# Patient Record
Sex: MALE | Race: WHITE
[De-identification: names, ages, dates, MRNs, and addresses within clinical notes are randomized per-mention and may not be internally consistent; named-entity substitution may affect disease eponyms.]

---

## 2018-12-12 ENCOUNTER — HOSPITAL ENCOUNTER (OUTPATIENT)
Dept: HOSPITAL 76 - DI | Age: 63
Discharge: HOME | End: 2018-12-12
Attending: NURSE PRACTITIONER
Payer: COMMERCIAL

## 2018-12-12 ENCOUNTER — HOSPITAL ENCOUNTER (OUTPATIENT)
Dept: HOSPITAL 76 - LAB.F | Age: 63
Discharge: HOME | End: 2018-12-12
Attending: NURSE PRACTITIONER
Payer: COMMERCIAL

## 2018-12-12 DIAGNOSIS — Z12.5: ICD-10-CM

## 2018-12-12 DIAGNOSIS — M17.0: Primary | ICD-10-CM

## 2018-12-12 DIAGNOSIS — Z13.220: Primary | ICD-10-CM

## 2018-12-12 LAB
CHOLEST SERPL-MCNC: 153 MG/DL
HDLC SERPL-MCNC: 41 MG/DL
HDLC SERPL: 3.7 {RATIO} (ref ?–5)
LDLC SERPL CALC-MCNC: 88 MG/DL
LDLC/HDLC SERPL: 2.1 {RATIO} (ref ?–3.6)
VLDLC SERPL-SCNC: 24 MG/DL

## 2018-12-12 PROCEDURE — 80061 LIPID PANEL: CPT

## 2018-12-12 PROCEDURE — 84153 ASSAY OF PSA TOTAL: CPT

## 2018-12-12 PROCEDURE — 36415 COLL VENOUS BLD VENIPUNCTURE: CPT

## 2018-12-12 PROCEDURE — 83721 ASSAY OF BLOOD LIPOPROTEIN: CPT

## 2018-12-12 NOTE — XRAY REPORT
Reason:  KNEE JOINT PAIN

Procedure Date:  12/12/2018   

Accession Number:  [id number] / [id number]                    

Procedure:  XR  - Knee 4 View BILAT CPT Code:  

 

FULL RESULT:

 

 

EXAMS:

1. Right Knee Radiography

2. Left Knee Radiography

 

EXAM DATE:12/12/2018 10:18 AM.

 

CLINICAL HISTORY:Knee joint pain.

 

COMPARISON: None.

 

TECHNIQUE: 4 views each.

 

FINDINGS:

Right Knee:

Bones: Normal. No fractures or bone lesions.

 

Joints: Mild loss of joint space height predominantly in the medial 

femorotibial compartment. No effusion. No subluxations.

 

Soft Tissues: Normal. No soft tissue swelling.

 

Left Knee:

Bones: Normal. No fractures or bone lesions.

 

Joints: Mild loss of joint space height predominantly in the medial 

femorotibial compartment. No effusion. No subluxations.

 

Soft Tissues: Normal. No soft tissue swelling.

IMPRESSION:

Bilateral mild degenerative changes.

 

RADIA

## 2023-10-29 ENCOUNTER — HOSPITAL ENCOUNTER (EMERGENCY)
Dept: HOSPITAL 76 - ED | Age: 68
Discharge: HOME | End: 2023-10-29
Payer: MEDICARE

## 2023-10-29 VITALS — SYSTOLIC BLOOD PRESSURE: 133 MMHG | DIASTOLIC BLOOD PRESSURE: 73 MMHG | OXYGEN SATURATION: 95 %

## 2023-10-29 DIAGNOSIS — K56.41: Primary | ICD-10-CM

## 2023-10-29 LAB
ALBUMIN DIAFP-MCNC: 4.8 G/DL (ref 3.2–5.5)
ALBUMIN/GLOB SERPL: 1.7 {RATIO} (ref 1–2.2)
ALP SERPL-CCNC: 69 IU/L (ref 42–121)
ALT SERPL W P-5'-P-CCNC: 28 IU/L (ref 10–60)
ANION GAP SERPL CALCULATED.4IONS-SCNC: 6 MMOL/L (ref 6–13)
AST SERPL W P-5'-P-CCNC: 21 IU/L (ref 10–42)
BASOPHILS NFR BLD AUTO: 0.1 10^3/UL (ref 0–0.1)
BASOPHILS NFR BLD AUTO: 0.6 %
BILIRUB BLD-MCNC: 0.8 MG/DL (ref 0.2–1)
BUN SERPL-MCNC: 12 MG/DL (ref 6–20)
CALCIUM UR-MCNC: 9.7 MG/DL (ref 8.5–10.3)
CHLORIDE SERPL-SCNC: 102 MMOL/L (ref 101–111)
CO2 SERPL-SCNC: 31 MMOL/L (ref 21–32)
CREAT SERPLBLD-SCNC: 0.8 MG/DL (ref 0.6–1.3)
EOSINOPHIL # BLD AUTO: 0.1 10^3/UL (ref 0–0.7)
EOSINOPHIL NFR BLD AUTO: 1.7 %
ERYTHROCYTE [DISTWIDTH] IN BLOOD BY AUTOMATED COUNT: 12.8 % (ref 12–15)
GFRSERPLBLD MDRD-ARVRAT: 97 ML/MIN/{1.73_M2} (ref 89–?)
GLOBULIN SER-MCNC: 2.9 G/DL (ref 2.1–4.2)
GLUCOSE SERPL-MCNC: 140 MG/DL (ref 74–104)
HCT VFR BLD AUTO: 46.7 % (ref 42–52)
HGB UR QL STRIP: 15.6 G/DL (ref 14–18)
LIPASE SERPL-CCNC: 12 U/L (ref 11–82)
LYMPHOCYTES # SPEC AUTO: 0.8 10^3/UL (ref 1.5–3.5)
LYMPHOCYTES NFR BLD AUTO: 10.3 %
MCH RBC QN AUTO: 31.8 PG (ref 27–31)
MCHC RBC AUTO-ENTMCNC: 33.4 G/DL (ref 32–36)
MCV RBC AUTO: 95.3 FL (ref 80–94)
MONOCYTES # BLD AUTO: 0.6 10^3/UL (ref 0–1)
MONOCYTES NFR BLD AUTO: 7.7 %
NEUTROPHILS # BLD AUTO: 6.5 10^3/UL (ref 1.5–6.6)
NEUTROPHILS # SNV AUTO: 8.1 X10^3/UL (ref 4.8–10.8)
NEUTROPHILS NFR BLD AUTO: 79.5 %
NRBC # BLD AUTO: 0 /100WBC
NRBC # BLD AUTO: 0 X10^3/UL
PDW BLD AUTO: 9.8 FL (ref 7.4–11.4)
PLATELET # BLD: 212 10^3/UL (ref 130–450)
POTASSIUM SERPL-SCNC: 4 MMOL/L (ref 3.5–4.5)
PROT SPEC-MCNC: 7.7 G/DL (ref 6.4–8.9)
RBC MAR: 4.9 10^6/UL (ref 4.7–6.1)
SODIUM SERPLBLD-SCNC: 139 MMOL/L (ref 135–145)

## 2023-10-29 PROCEDURE — 83690 ASSAY OF LIPASE: CPT

## 2023-10-29 PROCEDURE — 99283 EMERGENCY DEPT VISIT LOW MDM: CPT

## 2023-10-29 PROCEDURE — 85025 COMPLETE CBC W/AUTO DIFF WBC: CPT

## 2023-10-29 PROCEDURE — 36415 COLL VENOUS BLD VENIPUNCTURE: CPT

## 2023-10-29 PROCEDURE — 80053 COMPREHEN METABOLIC PANEL: CPT

## 2023-10-29 NOTE — ED PHYSICIAN DOCUMENTATION
History of Present Illness





- Stated complaint


Stated Complaint: GI,ABD PX





- Chief complaint


Chief Complaint: Abd Pain





- History obtained from


History obtained from: Patient





- Additonal information


Additional information: 


The patient comes to the emergency department for chief complaint of feeling the

urge to have a bowel movement but unable to do so since yesterday morning.  He 

states that it seems like the "blockage" is right near his anus.  He denies any 

blood in his stools.  He tried taking several doses of Dulcolax over the last 24

hours and now is having a little bit of liquid leaking Out when he tries to have

a bowel movement but nothing else.  He has not tried any home enemas.  No 

abdominal pain.  No nausea or vomiting.  He has a history of a right inguinal 

hernia repair and has not noticed any bulging or other issues in the area.  No 

other complaints at this time.  His last colonoscopy was 7 years ago at which 

time he was told he did not need another one for 10 years.








PD PAST MEDICAL HISTORY





- Past Medical History


Past Medical History: No





- Past Surgical History


Past Surgical History: Yes


General: Other





- Present Medications


Home Medications: 


                                Ambulatory Orders











 Medication  Instructions  Recorded  Confirmed


 


Magnesium Citrate 148 ml PO BID PRN #396 ml 10/29/23 














- Allergies


Allergies/Adverse Reactions: 


                                    Allergies











Allergy/AdvReac Type Severity Reaction Status Date / Time


 


No Known Drug Allergies Allergy   Verified 10/29/23 09:41














- Social History


Does the pt smoke?: No


Smoking Status: Never smoker





PD ED PE NORMAL





- Vitals


Vital signs reviewed: Yes





- General


General: Alert and oriented X 3, No acute distress, Well developed/nourished





- HEENT


HEENT: Atraumatic, PERRL





- Neck


Neck: Supple, no meningeal sign





- Respiratory


Respiratory: No respiratory distress





- Abdomen


Abdomen: Soft, Non tender, Non distended





- Rectal


Rectal: Other (Normal external exam.  On digital rectal exam the patient has a 

large, claylike mass of stool. No blood.  No soft tissue mass.)





- Derm


Derm: Warm and dry





- Extremities


Extremities: No deformity





- Neuro


Neuro: Alert and oriented X 3





- Psych


Psych: Normal mood, Normal affect





Results





- Vitals


Vitals: 


                               Vital Signs - 24 hr











  10/29/23 10/29/23





  09:39 13:41


 


Temperature 36.3 C L 


 


Heart Rate 78 77


 


Respiratory 14 18





Rate  


 


Blood Pressure 161/85 H 133/73 H


 


O2 Saturation 99 95








                                     Oxygen











O2 Source                      Room air

















- Labs


Labs: 


                                Laboratory Tests











  10/29/23 10/29/23





  09:19 09:19


 


WBC  8.1 


 


RBC  4.90 


 


Hgb  15.6 


 


Hct  46.7 


 


MCV  95.3 H 


 


MCH  31.8 H 


 


MCHC  33.4 


 


RDW  12.8 


 


Plt Count  212 


 


MPV  9.8 


 


Neut # (Auto)  6.5 


 


Lymph # (Auto)  0.8 L 


 


Mono # (Auto)  0.6 


 


Eos # (Auto)  0.1 


 


Baso # (Auto)  0.1 


 


Absolute Nucleated RBC  0.00 


 


Nucleated RBC %  0.0 


 


Sodium   139


 


Potassium   4.0


 


Chloride   102


 


Carbon Dioxide   31


 


Anion Gap   6.0


 


BUN   12


 


Creatinine   0.8


 


Estimated GFR (MDRD)   97


 


Glucose   140 H


 


Calcium   9.7


 


Total Bilirubin   0.8


 


AST   21


 


ALT   28


 


Alkaline Phosphatase   69


 


Total Protein   7.7


 


Albumin   4.8


 


Globulin   2.9


 


Albumin/Globulin Ratio   1.7


 


Lipase   12














PD Medical Decision Making





- ED course


Complexity details: considered differential, d/w patient


ED course: 


Disimpaction was performed in the emergency department.  The patient had a large

bowel movement here and reported feeling much better.  I have discussed with the

patient that he will need to do some home enemas and take laxative, as well as 

make a habit of eating a higher fiber diet as this does not happen again.








Departure





- Departure


Disposition: 01 Home, Self Care


Clinical Impression: 


 Fecal impaction





Condition: Stable


Instructions:  ED Impaction Fecal Treated


Prescriptions: 


Magnesium Citrate 148 ml PO BID PRN #396 ml


 PRN Reason: Constipation


Comments: 


A fecal disimpaction has been performed today and quite a bit of stool has been 

gotten out of your rectum.  However, there is more that will need to be pushed 

out.  You will need to work on softening this at home by using at home enemas, 

which you can get over-the-counter at any pharmacy.  You should do them every 6 

hours until you are able to have a bowel movement.  A liquid laxative has also 

been prescribed and the prescription has been sent to the [facility] 

in [place] at your request.  Please take this as prescribed as well until you 

have a bowel movement.


Forms:  PCP List


Discharge Date/Time: 10/29/23 13:51